# Patient Record
Sex: FEMALE | Race: WHITE | NOT HISPANIC OR LATINO | URBAN - METROPOLITAN AREA
[De-identification: names, ages, dates, MRNs, and addresses within clinical notes are randomized per-mention and may not be internally consistent; named-entity substitution may affect disease eponyms.]

---

## 2018-09-16 ENCOUNTER — EMERGENCY (EMERGENCY)
Facility: HOSPITAL | Age: 83
LOS: 0 days | Discharge: HOME | End: 2018-09-16
Attending: EMERGENCY MEDICINE
Payer: MEDICARE

## 2018-09-16 VITALS
WEIGHT: 194.01 LBS | SYSTOLIC BLOOD PRESSURE: 132 MMHG | DIASTOLIC BLOOD PRESSURE: 59 MMHG | TEMPERATURE: 98 F | RESPIRATION RATE: 16 BRPM | HEIGHT: 60 IN | OXYGEN SATURATION: 96 % | HEART RATE: 78 BPM

## 2018-09-16 DIAGNOSIS — Z86.718 PERSONAL HISTORY OF OTHER VENOUS THROMBOSIS AND EMBOLISM: ICD-10-CM

## 2018-09-16 DIAGNOSIS — M25.561 PAIN IN RIGHT KNEE: ICD-10-CM
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
## 2018-09-16 PROBLEM — Z00.00 ENCOUNTER FOR PREVENTIVE HEALTH EXAMINATION: Status: ACTIVE | Noted: 2018-09-16

## 2018-09-16 PROCEDURE — 73562 X-RAY EXAM OF KNEE 3: CPT | Mod: 26,RT

## 2018-09-16 PROCEDURE — 99283 EMERGENCY DEPT VISIT LOW MDM: CPT

## 2018-09-16 RX ORDER — CYCLOBENZAPRINE HYDROCHLORIDE 10 MG/1
1 TABLET, FILM COATED ORAL
Qty: 12 | Refills: 0
Start: 2018-09-16

## 2018-09-16 RX ORDER — ACETAMINOPHEN 500 MG
650 TABLET ORAL ONCE
Qty: 0 | Refills: 0 | Status: COMPLETED | OUTPATIENT
Start: 2018-09-16 | End: 2018-09-16

## 2018-09-16 RX ADMIN — Medication 650 MILLIGRAM(S): at 16:32

## 2018-09-16 NOTE — ED ADULT NURSE NOTE - NSIMPLEMENTINTERV_GEN_ALL_ED
Implemented All Universal Safety Interventions:  [place] to call system. Call bell, personal items and telephone within reach. Instruct patient to call for assistance. Room bathroom lighting operational. Non-slip footwear when patient is off stretcher. Physically safe environment: no spills, clutter or unnecessary equipment. Stretcher in lowest position, wheels locked, appropriate side rails in place.

## 2018-09-16 NOTE — ED ADULT TRIAGE NOTE - CHIEF COMPLAINT QUOTE
Pt c/o intermittent right knee pain s/p twisting it yesterday states she heard a snap. Pt states she usually ambulate using wheelchair when she is out

## 2018-09-16 NOTE — ED PROVIDER NOTE - PHYSICAL EXAMINATION
Gen: No acute distress, non toxic  HEENT: Mucous membranes moist, pink conjunctivae, EOMI  CV: RRR, nl s1/s2.  Resp: CTAB, normal rate and effort  GI: Abdomen soft, NT, ND. No rebound, no guarding  : No CVAT  Neuro: A&O x 3, moving all 4 extremities  MSK: No spine or joint tenderness to palpation. no laxity to right knee. can range, gait with slight favoring of right leg using cane. neurovascularly intact, patella in place. no masses. no swelling/erythema b/l le.   Skin: No rashes. intact and perfused.

## 2018-09-16 NOTE — ED PROVIDER NOTE - MEDICAL DECISION MAKING DETAILS
pt with twisting injury of right knee, ambulatory, no other red flags. xray neg. can ambulate. no immobilization given dvt's in past and no indication, will give close ortho f/u. also with sciatica, unlikely dvt given presentation and with filter and on coumadin.

## 2020-06-11 ENCOUNTER — EMERGENCY (EMERGENCY)
Facility: HOSPITAL | Age: 85
LOS: 0 days | Discharge: HOME | End: 2020-06-11
Attending: EMERGENCY MEDICINE
Payer: MEDICARE

## 2020-06-11 VITALS
OXYGEN SATURATION: 98 % | DIASTOLIC BLOOD PRESSURE: 82 MMHG | TEMPERATURE: 98 F | SYSTOLIC BLOOD PRESSURE: 146 MMHG | HEART RATE: 90 BPM | RESPIRATION RATE: 17 BRPM

## 2020-06-11 VITALS
DIASTOLIC BLOOD PRESSURE: 89 MMHG | TEMPERATURE: 98 F | HEIGHT: 59 IN | SYSTOLIC BLOOD PRESSURE: 161 MMHG | RESPIRATION RATE: 18 BRPM | WEIGHT: 188.05 LBS | HEART RATE: 106 BPM | OXYGEN SATURATION: 97 %

## 2020-06-11 LAB
ALBUMIN SERPL ELPH-MCNC: 3.6 G/DL — SIGNIFICANT CHANGE UP (ref 3.3–5)
ALP SERPL-CCNC: 130 U/L — HIGH (ref 40–120)
ALT FLD-CCNC: 18 U/L — SIGNIFICANT CHANGE UP (ref 12–78)
ANION GAP SERPL CALC-SCNC: 4 MMOL/L — LOW (ref 5–17)
APTT BLD: 51.7 SEC — HIGH (ref 27.5–36.3)
AST SERPL-CCNC: 32 U/L — SIGNIFICANT CHANGE UP (ref 15–37)
BASOPHILS # BLD AUTO: 0.04 K/UL — SIGNIFICANT CHANGE UP (ref 0–0.2)
BASOPHILS NFR BLD AUTO: 0.7 % — SIGNIFICANT CHANGE UP (ref 0–2)
BILIRUB SERPL-MCNC: 0.6 MG/DL — SIGNIFICANT CHANGE UP (ref 0.2–1.2)
BUN SERPL-MCNC: 41 MG/DL — HIGH (ref 7–23)
CALCIUM SERPL-MCNC: 8.9 MG/DL — SIGNIFICANT CHANGE UP (ref 8.5–10.1)
CHLORIDE SERPL-SCNC: 114 MMOL/L — HIGH (ref 96–108)
CO2 SERPL-SCNC: 24 MMOL/L — SIGNIFICANT CHANGE UP (ref 22–31)
CREAT SERPL-MCNC: 1.23 MG/DL — SIGNIFICANT CHANGE UP (ref 0.5–1.3)
EOSINOPHIL # BLD AUTO: 0.01 K/UL — SIGNIFICANT CHANGE UP (ref 0–0.5)
EOSINOPHIL NFR BLD AUTO: 0.2 % — SIGNIFICANT CHANGE UP (ref 0–6)
GLUCOSE SERPL-MCNC: 101 MG/DL — HIGH (ref 70–99)
HCT VFR BLD CALC: 38.2 % — SIGNIFICANT CHANGE UP (ref 34.5–45)
HGB BLD-MCNC: 12.1 G/DL — SIGNIFICANT CHANGE UP (ref 11.5–15.5)
IMM GRANULOCYTES NFR BLD AUTO: 0.4 % — SIGNIFICANT CHANGE UP (ref 0–1.5)
INR BLD: 6.35 RATIO — CRITICAL HIGH (ref 0.88–1.16)
LYMPHOCYTES # BLD AUTO: 1.46 K/UL — SIGNIFICANT CHANGE UP (ref 1–3.3)
LYMPHOCYTES # BLD AUTO: 26.8 % — SIGNIFICANT CHANGE UP (ref 13–44)
MCHC RBC-ENTMCNC: 27.2 PG — SIGNIFICANT CHANGE UP (ref 27–34)
MCHC RBC-ENTMCNC: 31.7 GM/DL — LOW (ref 32–36)
MCV RBC AUTO: 85.8 FL — SIGNIFICANT CHANGE UP (ref 80–100)
MONOCYTES # BLD AUTO: 0.71 K/UL — SIGNIFICANT CHANGE UP (ref 0–0.9)
MONOCYTES NFR BLD AUTO: 13.1 % — SIGNIFICANT CHANGE UP (ref 2–14)
NEUTROPHILS # BLD AUTO: 3.2 K/UL — SIGNIFICANT CHANGE UP (ref 1.8–7.4)
NEUTROPHILS NFR BLD AUTO: 58.8 % — SIGNIFICANT CHANGE UP (ref 43–77)
NRBC # BLD: 0 /100 WBCS — SIGNIFICANT CHANGE UP (ref 0–0)
PLATELET # BLD AUTO: 237 K/UL — SIGNIFICANT CHANGE UP (ref 150–400)
POTASSIUM SERPL-MCNC: 5 MMOL/L — SIGNIFICANT CHANGE UP (ref 3.5–5.3)
POTASSIUM SERPL-SCNC: 5 MMOL/L — SIGNIFICANT CHANGE UP (ref 3.5–5.3)
PROT SERPL-MCNC: 8.2 GM/DL — SIGNIFICANT CHANGE UP (ref 6–8.3)
PROTHROM AB SERPL-ACNC: 75.3 SEC — HIGH (ref 10–12.9)
RBC # BLD: 4.45 M/UL — SIGNIFICANT CHANGE UP (ref 3.8–5.2)
RBC # FLD: 15.4 % — HIGH (ref 10.3–14.5)
SODIUM SERPL-SCNC: 142 MMOL/L — SIGNIFICANT CHANGE UP (ref 135–145)
WBC # BLD: 5.44 K/UL — SIGNIFICANT CHANGE UP (ref 3.8–10.5)
WBC # FLD AUTO: 5.44 K/UL — SIGNIFICANT CHANGE UP (ref 3.8–10.5)

## 2020-06-11 PROCEDURE — 93970 EXTREMITY STUDY: CPT | Mod: 26

## 2020-06-11 PROCEDURE — 99285 EMERGENCY DEPT VISIT HI MDM: CPT

## 2020-06-11 RX ORDER — PHYTONADIONE (VIT K1) 5 MG
2.5 TABLET ORAL ONCE
Refills: 0 | Status: COMPLETED | OUTPATIENT
Start: 2020-06-11 | End: 2020-06-11

## 2020-06-11 RX ORDER — WARFARIN SODIUM 2.5 MG/1
1 TABLET ORAL
Qty: 0 | Refills: 0 | DISCHARGE

## 2020-06-11 RX ADMIN — Medication 2.5 MILLIGRAM(S): at 12:40

## 2020-06-11 NOTE — ED ADULT TRIAGE NOTE - CHIEF COMPLAINT QUOTE
Sent by Dr Horvath for elevated INR, takes coumadin, fell in April , legs reddened, carrying prescription for US of legs, no signs of bleeding

## 2020-06-11 NOTE — ED PROVIDER NOTE - OBJECTIVE STATEMENT
84 y/o F Hx of DVT on coumadin presents to ED due to elevated INR levels. 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 pt has not had any fevers or chills. Pt previously has had leg cellulitis prior and was already treated and healing

## 2020-06-11 NOTE — ED ADULT NURSE NOTE - OBJECTIVE STATEMENT
Pt A&Ox3, denies chest pain or sob sent by PMD for abnormal lab result. Pt c/o bilateral swelling to LE with chronic discoloration. Labs sent, pt via stretcher to ultrasound. Plan of care discussed, pt verbalize understanding

## 2020-06-11 NOTE — ED PROVIDER NOTE - CLINICAL SUMMARY MEDICAL DECISION MAKING FREE TEXT BOX
INR elevation - will treat with skipping one dose of coumadin as per protocol, given 1 dose of Vit K 2.5mg oral otherwise will dc with follow up with own doctors for issue.

## 2020-06-11 NOTE — ED PROVIDER NOTE - MUSCULOSKELETAL, MLM
bilateral darkening coloration of skin and pigmentation congruent with venastasis. Non-tender, slightly warm to touch.

## 2020-06-11 NOTE — ED ADULT NURSE NOTE - NSIMPLEMENTINTERV_GEN_ALL_ED
Implemented All Fall Risk Interventions:  [place] to call system. Call bell, personal items and telephone within reach. Instruct patient to call for assistance. Room bathroom lighting operational. Non-slip footwear when patient is off stretcher. Physically safe environment: no spills, clutter or unnecessary equipment. Stretcher in lowest position, wheels locked, appropriate side rails in place. Provide visual cue, wrist band, yellow gown, etc. Monitor gait and stability. Monitor for mental status changes and reorient to person, place, and time. Review medications for side effects contributing to fall risk. Reinforce activity limits and safety measures with patient and family.

## 2020-06-11 NOTE — ED PROVIDER NOTE - CARE PLAN
Principal Discharge DX:	INR (international normal ratio) abnormal  Secondary Diagnosis:	Venous stasis

## 2020-06-11 NOTE — ED PROVIDER NOTE - PATIENT PORTAL LINK FT
You can access the FollowMyHealth Patient Portal offered by [facility] by registering at the following website: [email or web address]. By joining [employer]’s FollowMyHealth portal, you will also be able to view your health information using other applications (apps) compatible with our system.

## 2020-06-12 DIAGNOSIS — M79.89 OTHER SPECIFIED SOFT TISSUE DISORDERS: ICD-10-CM

## 2020-06-12 DIAGNOSIS — Z79.01 LONG TERM (CURRENT) USE OF ANTICOAGULANTS: ICD-10-CM

## 2020-06-12 DIAGNOSIS — R79.1 ABNORMAL COAGULATION PROFILE: ICD-10-CM

## 2020-06-12 DIAGNOSIS — I82.409 ACUTE EMBOLISM AND THROMBOSIS OF UNSPECIFIED DEEP VEINS OF UNSPECIFIED LOWER EXTREMITY: ICD-10-CM

## 2020-06-12 DIAGNOSIS — I87.8 OTHER SPECIFIED DISORDERS OF VEINS: ICD-10-CM

## 2020-09-06 ENCOUNTER — EMERGENCY (EMERGENCY)
Facility: HOSPITAL | Age: 85
LOS: 0 days | Discharge: HOME | End: 2020-09-06
Attending: STUDENT IN AN ORGANIZED HEALTH CARE EDUCATION/TRAINING PROGRAM
Payer: MEDICARE

## 2020-09-06 VITALS
TEMPERATURE: 98 F | WEIGHT: 179.9 LBS | OXYGEN SATURATION: 98 % | SYSTOLIC BLOOD PRESSURE: 154 MMHG | HEIGHT: 59 IN | DIASTOLIC BLOOD PRESSURE: 68 MMHG | RESPIRATION RATE: 16 BRPM | HEART RATE: 78 BPM

## 2020-09-06 VITALS
OXYGEN SATURATION: 98 % | SYSTOLIC BLOOD PRESSURE: 151 MMHG | RESPIRATION RATE: 15 BRPM | HEART RATE: 74 BPM | DIASTOLIC BLOOD PRESSURE: 66 MMHG | TEMPERATURE: 98 F

## 2020-09-06 DIAGNOSIS — I82.409 ACUTE EMBOLISM AND THROMBOSIS OF UNSPECIFIED DEEP VEINS OF UNSPECIFIED LOWER EXTREMITY: ICD-10-CM

## 2020-09-06 DIAGNOSIS — Z79.01 LONG TERM (CURRENT) USE OF ANTICOAGULANTS: ICD-10-CM

## 2020-09-06 DIAGNOSIS — R79.1 ABNORMAL COAGULATION PROFILE: ICD-10-CM

## 2020-09-06 DIAGNOSIS — L03.116 CELLULITIS OF LEFT LOWER LIMB: ICD-10-CM

## 2020-09-06 DIAGNOSIS — I10 ESSENTIAL (PRIMARY) HYPERTENSION: ICD-10-CM

## 2020-09-06 LAB
ALBUMIN SERPL ELPH-MCNC: 3.8 G/DL — SIGNIFICANT CHANGE UP (ref 3.3–5)
ALP SERPL-CCNC: 122 U/L — HIGH (ref 40–120)
ALT FLD-CCNC: 22 U/L — SIGNIFICANT CHANGE UP (ref 12–78)
ANION GAP SERPL CALC-SCNC: 7 MMOL/L — SIGNIFICANT CHANGE UP (ref 5–17)
APTT BLD: 42.8 SEC — HIGH (ref 27.5–35.5)
AST SERPL-CCNC: 29 U/L — SIGNIFICANT CHANGE UP (ref 15–37)
BASOPHILS # BLD AUTO: 0.04 K/UL — SIGNIFICANT CHANGE UP (ref 0–0.2)
BASOPHILS NFR BLD AUTO: 0.7 % — SIGNIFICANT CHANGE UP (ref 0–2)
BILIRUB SERPL-MCNC: 0.4 MG/DL — SIGNIFICANT CHANGE UP (ref 0.2–1.2)
BUN SERPL-MCNC: 38 MG/DL — HIGH (ref 7–23)
CALCIUM SERPL-MCNC: 9.2 MG/DL — SIGNIFICANT CHANGE UP (ref 8.5–10.1)
CHLORIDE SERPL-SCNC: 110 MMOL/L — HIGH (ref 96–108)
CO2 SERPL-SCNC: 22 MMOL/L — SIGNIFICANT CHANGE UP (ref 22–31)
CREAT SERPL-MCNC: 2.01 MG/DL — HIGH (ref 0.5–1.3)
EOSINOPHIL # BLD AUTO: 0 K/UL — SIGNIFICANT CHANGE UP (ref 0–0.5)
EOSINOPHIL NFR BLD AUTO: 0 % — SIGNIFICANT CHANGE UP (ref 0–6)
GLUCOSE SERPL-MCNC: 88 MG/DL — SIGNIFICANT CHANGE UP (ref 70–99)
HCT VFR BLD CALC: 36.5 % — SIGNIFICANT CHANGE UP (ref 34.5–45)
HGB BLD-MCNC: 11.5 G/DL — SIGNIFICANT CHANGE UP (ref 11.5–15.5)
IMM GRANULOCYTES NFR BLD AUTO: 0.3 % — SIGNIFICANT CHANGE UP (ref 0–1.5)
INR BLD: 8.22 RATIO — CRITICAL HIGH (ref 0.88–1.16)
LYMPHOCYTES # BLD AUTO: 1.34 K/UL — SIGNIFICANT CHANGE UP (ref 1–3.3)
LYMPHOCYTES # BLD AUTO: 23 % — SIGNIFICANT CHANGE UP (ref 13–44)
MCHC RBC-ENTMCNC: 27.6 PG — SIGNIFICANT CHANGE UP (ref 27–34)
MCHC RBC-ENTMCNC: 31.5 GM/DL — LOW (ref 32–36)
MCV RBC AUTO: 87.7 FL — SIGNIFICANT CHANGE UP (ref 80–100)
MONOCYTES # BLD AUTO: 0.57 K/UL — SIGNIFICANT CHANGE UP (ref 0–0.9)
MONOCYTES NFR BLD AUTO: 9.8 % — SIGNIFICANT CHANGE UP (ref 2–14)
NEUTROPHILS # BLD AUTO: 3.86 K/UL — SIGNIFICANT CHANGE UP (ref 1.8–7.4)
NEUTROPHILS NFR BLD AUTO: 66.2 % — SIGNIFICANT CHANGE UP (ref 43–77)
NRBC # BLD: 0 /100 WBCS — SIGNIFICANT CHANGE UP (ref 0–0)
PLATELET # BLD AUTO: 276 K/UL — SIGNIFICANT CHANGE UP (ref 150–400)
POTASSIUM SERPL-MCNC: 5.4 MMOL/L — HIGH (ref 3.5–5.3)
POTASSIUM SERPL-SCNC: 5.4 MMOL/L — HIGH (ref 3.5–5.3)
PROT SERPL-MCNC: 8.2 GM/DL — SIGNIFICANT CHANGE UP (ref 6–8.3)
PROTHROM AB SERPL-ACNC: 86.3 SEC — HIGH (ref 10.6–13.6)
RBC # BLD: 4.16 M/UL — SIGNIFICANT CHANGE UP (ref 3.8–5.2)
RBC # FLD: 15.1 % — HIGH (ref 10.3–14.5)
SODIUM SERPL-SCNC: 139 MMOL/L — SIGNIFICANT CHANGE UP (ref 135–145)
WBC # BLD: 5.83 K/UL — SIGNIFICANT CHANGE UP (ref 3.8–10.5)
WBC # FLD AUTO: 5.83 K/UL — SIGNIFICANT CHANGE UP (ref 3.8–10.5)

## 2020-09-06 PROCEDURE — 99284 EMERGENCY DEPT VISIT MOD MDM: CPT

## 2020-09-06 RX ORDER — PHYTONADIONE (VIT K1) 5 MG
2.5 TABLET ORAL ONCE
Refills: 0 | Status: COMPLETED | OUTPATIENT
Start: 2020-09-06 | End: 2020-09-06

## 2020-09-06 RX ADMIN — Medication 2.5 MILLIGRAM(S): at 16:57

## 2020-09-06 NOTE — ED PROVIDER NOTE - PATIENT PORTAL LINK FT
You can access the FollowMyHealth Patient Portal offered by [facility] by registering at the following website: [email or web address]. By joining [employer]’s FollowMyHealth portal, you will also be able to view your health information using other applications (apps) compatible with our system.

## 2020-09-06 NOTE — ED PROVIDER NOTE - NSFOLLOWUPCLINICS_GEN_ALL_ED_FT
[facility]  Coumadin  [address] [place] [address]  Phone: [phone]  Fax:   Follow Up Time: 1-3 Days

## 2020-09-06 NOTE — ED PROVIDER NOTE - NSFOLLOWUPINSTRUCTIONS_ED_ALL_ED_FT
Please skip your next 2 doses of coumadin. Please follow up in anticoagulation clinic for repeat INR within 72 hours.

## 2020-09-06 NOTE — ED PROVIDER NOTE - CLINICAL SUMMARY MEDICAL DECISION MAKING FREE TEXT BOX
Ins 85yo F w/ PMH HTN, DVT on Coumadin since 2003 sent to ED by PCP d/t INR > 9. AFVSS. Pt denies obvious bleeding, no evidence of occult bleeding on HPI/ROS/PE. Plan: Obtain CBC, CMP, PT/PTT/INR. INR 8.2. D/t advanced age, will give Vit K. Pt w/ decr renal function since June. Otherwise labs w/o significant abnormalities. Called pt PCP to discuss w/o answer. Pt is stable for d/c home. Pt given f/u w/ [facility]. Stressed importance of regular INR checks. Pt will make phone calls to get f/u in a clinic Tuesday AM and go to her PCP office for rpt INR. Pt instructed to skip next 2 doses of coumadin. Return signs and sx d/w pt, she understands and agrees w/ this plan.

## 2020-09-06 NOTE — ED PROVIDER NOTE - DISPOSITION TYPE
How Many Skin Cancers Have You Had?: more than one What Is The Reason For Today's Visit?: History of Non-Melanoma Skin Cancer DISCHARGE

## 2020-09-06 NOTE — ED PROVIDER NOTE - PHYSICAL EXAMINATION
GEN: Awake, alert, interactive, NAD.  HEAD AND NECK: NC/AT. Airway patent. Neck supple.   EYES:  Clear b/l. EOMI. PERRL.   ENT: Moist mucus membranes.   CARDIAC: Regular rate, regular rhythm. No evident pedal edema.    RESP/CHEST: Normal respiratory effort with no use of accessory muscles or retractions. Clear throughout on auscultation.  ABD: Soft, non-distended, non-tender. No rebound, no guarding.   BACK: No midline spinal TTP. No CVAT.   EXTREMITIES: Moving all extremities with no apparent deformities.   SKIN: Warm, dry. L distal tib/fib cellulitis, dressing C/D/I.   NEURO: AOx3, CN II-XII grossly intact, no focal deficits.   PSYCH: Appropriate mood and affect.

## 2020-09-06 NOTE — ED PROVIDER NOTE - PROGRESS NOTE DETAILS
Vit K given INR > 8, no evidence of bleeding, however d/t advanced age, will give Vit K 2.5 Called pt PCP, Dr Rina Horvath. Left  requesting call back to discuss pt results, future planning for better control of INR.  Pt reports previously followed in INR clinic w/ Dr Moya, but her insurance is no longer accepted. Pt currently w/o scheduled INR checks, not in coumadin clinic, w/o scheduled regular f/u.

## 2020-09-06 NOTE — ED PROVIDER NOTE - OBJECTIVE STATEMENT
87yo F sent to ED by PCP d/t INR > 9. Pt reports blood drawn several days, lab error, redrawn, pt called and told to go to ED d/t elevated INR. Reports hx of similar months ago. Pt on coumadin. On coumadin x 20yrs, INR target 2.5. On coumadin d/t DVTs. Pt endorses fatigue. ROS otherwise neg. Pt w/ LLE cellulitis, being followed by wound care. PCP also wanted to check pt renal function.     PMH DVT, cellulitis, occasional HTN meds as listed, NKDA, PSH hernia. 85yo F sent to ED by PCP d/t INR > 9. Pt reports blood drawn several days, lab error, redrawn, pt called and told to go to ED d/t elevated INR. Reports hx of similar months ago (chart review shows INR 6/3 6/11/20). Pt on coumadin. On coumadin x 20yrs, INR target 2.5. On coumadin d/t DVTs. Pt endorses generalized fatigue, but also mentions several life stressors. ROS otherwise neg. Pt w/ LLE cellulitis, being followed by wound care. PCP also wanted to check pt renal function.     PMH DVT, cellulitis, HTN, meds as listed, NKDA, PSH hernia.

## 2022-03-09 NOTE — ED ADULT NURSE NOTE - NS ED NOTE ABUSE SUSPICION NEGLECT YN
Patient withdrawn to room throughout the day  Minimal in conversation  Denies any pain or discomfort  Denies any anxiety or depression  Denies SI/HI/AVH  Compliant with medication and meals  No acute behaviors noted  Denies any distress  Limited thoughts  Q 7 min safety checks maintained  Monitoring continues  No

## 2022-03-28 NOTE — ED ADULT NURSE NOTE - PAIN RATING/NUMBER SCALE (0-10): ACTIVITY
3/28/2022    Referring Provider: Referred Self    Presenting Information:   I met with Julienne today through the Cancer Risk Management Program via video for genetic counseling to discuss the known MSH6 mutation in her family. 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 She is here today to review this history, cancer screening recommendations, and available genetic testing options.    Personal History:  Julienne is a 31 year old female.  She does not have any personal history of cancer.      She had her first menstrual period at age 11, her first child at age 31, and is premenopausal. She reports a history pf PCOS and that she is 9 months post-partum, periods have not resumed at this time. Julienne has her ovaries, fallopian tubes and uterus in place.  She reports past history of oral contraceptive use for about 8 years and that she has never been on hormone replacement therapy.      Her most recent OB-GYN exam and Pap smear in 2019 were normal.  She reports regular self-breast exams and clinical exams; has not started screening mammograms given her age. She has had two breast ultrasounds in the past few years due to palpable lumps; findings were benign. Colonoscopy in 2017 for IBS work-up was unremarkable and general population follow-up was recommended. She report regular (every 1-2 years) dermatology visits for skin checks.  Julienne reported no tobacco use, and social (0-2 drinks per week) alcohol use.    Family History: Cancer family history and pertinent information reviewed below (Please see scanned pedigree for detailed family history information)    Father, age 61, has no personal history of cancer. Has tested positive for the familial MSH6 mutation via [facility].     Paternal aunt, age 59, has a history of multiple skin cancers (basal cells, squamous cells, and melanoma) that began in her late 40's; does have a history of sun tanning; history of essential thrombocythemia;  currently had an endometrial biopsy that may have found abnormal calls and is being worked-up further. This aunt has tested positive for the familial MSH6 mutation.     Paternal aunt (identical twin to above aunt), age 59, has a history of cervical cancer diagnosed at age 40 and is s/p ROSE-BSO; DOLLY exposure reported during pregnancy. This aunt is also positive for the familial MSH6 mutation.     Paternal uncle passed at age 48 from esophageal cancer diagnosed at age 48; he was a smoker.    Paternal grandmother, age 85, was recently diagnosed with lung cancer; has past history of smoking.     Paternal grandfather, age 88, has a history of prostate cancer diagnosed at age 69 and colon cancer diagnosed at age 72; also has a history of sebaceous neoplasms, basal cell carcinoma, squamous cell carcinoma. This grandfather was the testing proband for the family in which the familial MSH6 mutation was originally found.     Paternal grandfather's sister had a jaw cancer diagnosed at an unknown age.     Paternal grandmother's sister had ovarian cancer diagnosed at age 54.     Multiple paternal first-cousins (grandfather's side) once-removed with cancer history including breast (at age 54 and 60), skin cancers (BCC, SCC), non-Hodgkin's lymphoma, prostate cancer, and melanoma.     There is no known Ashkenazi Pentec[employer]al ancestry on either side of her family. There is no reported consanguinity.    Discussion:    Julienne has a family history of colon cancer with an identified MSH6 gene mutation.  Specifically, the mutation identified in her father, paternal grandfather, and two paternal aunts is called c.3514dup ([id number]).  We discussed that this gene is associated with Mayer syndrome and an increased risk for colon, gynecologic, and other cancers.  We discussed the impact of this testing on Julienne and her family in detail.  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 She also gave me information to help link her grandfather's testing to her order through [facility]'s free family follow-up testing program.     We discussed the natural history and genetics of Mayer syndrome caused by mutations in the MSH6 gene. A detailed handout regarding this cancer syndrome and the information we discussed was provided to Julienne at the end of our appointment today and can be found in the after visit summary.  Topics included: inheritance pattern, cancer risks, cancer screening recommendations, and also risks, benefits and limitations of testing.    We reviewed that Mayer syndrome due to mutations in the MSH6 gene is associated with increased risk for colon cancer (10-44%), uterine cancer (16-49%), ovarian cancer (up to 13%), stomach cancer (up to 7.8%) and urinary tract cancers (up to 5.5%). We discussed that sebaceous neoplasms have also jessica seen in families with MSH6-related Mayer syndrome, although specific lifetime risks are not available.     We reviewed some of the the screeningmanagement recommendations for those with MSH6-related Mayer syndrome including earlier/more frequent colonoscopies and gynecologic cancer screening vs surgery. I did share that we have a clinical nurse specialist who helps coordinate this screening should Julienne test positive for the familial mutation.     We reviewed that mutations in the MSH6 gene are inherited in an autosomal dominant pattern.  This means that Julienne has a 50% chance of inheriting the same mutation which was identified in her father.  Mutations in this gene to not skip generations.     Given her family history, Julienne meets National Comprehensive Cancer Network guidelines for genetic testing for the familial MSH6 mutation.     We briefly discussed the option of more comprehensive testing for additional genes  associated with hereditary cancers. We discussed that many of her close-relatives' cancer histories (with the excpetion of her paternal grandfather in the context of his Mayer syndrome diagnosis) seem to have at least some level of known environmental exposure and are otherwise not overly concerning from an inherited risk perspective. We also discussed that the cancers seen in her paternal aunts and uncle are typically not those that we would associated with Mayer syndrome and may certainly be unrelated. Julienne expressed understanding and declined any additional testing at this time. She opted to proceed with targeted MSH6 testing through [facility].      Genetic testing is available for the familial MSH6 gene mutation identified in her father and other paternal relatives.  Julienne elected to proceed with this testing today.      Consent was obtained virtually with no witness required due to the current covid19 global pandemic.    The information from genetic testing may determine additional cancer screening recommendations as well as options for risk reducing surgeries for Julienne and her relatives.  These recommendations will be discussed in detail once genetic testing is completed.    Given the current COVID19 global pandemic, we discussed genetic testing options for Julienne including having [facility] send a saliva kit to her home or coming into the clinic for a blood draw. Julienne opted to have a salivia kit sent to her home to complete the genetic testing. A kit was ordered from [facility] and a saliva collection kit will be sent to Julienne's home address. I confirmed with Julienne that the address we have on file is her current and correct address. We discussed some of the limitations of completing genetic testing via saliva and Julienne was instructed to follow the instruction provided in the kit to ensure the best possibility of success for saliva genetic testing.       Plan:  1) Today, Julienne elected to proceed  with MSH6 genetic testing (1 gene) [facility]. A saliva collection kit will be mailed to Julienne's home address by [facility]. Julienne is encouraged to follow the specimen collection and return mail instructions provided in the kit.  2) This information should be available in approximately 2-3 weeks from the lab receiving the sample.  3) Julienne will be contacted by our scheduling department to set up a result disclosure appointment.    Video start time: 2:12pm  Video end time: 3:05pm    Julianna Kaminski MS, [facility]  Licensed, Certified Genetic Counselor  [facility]  [phone]  [email or web address]           5

## 2024-06-17 ENCOUNTER — APPOINTMENT (OUTPATIENT)
Dept: VASCULAR SURGERY | Facility: CLINIC | Age: 89
End: 2024-06-17

## 2024-06-25 ENCOUNTER — APPOINTMENT (OUTPATIENT)
Dept: VASCULAR SURGERY | Facility: CLINIC | Age: 89
End: 2024-06-25
Payer: MEDICARE

## 2024-06-25 VITALS — HEART RATE: 67 BPM | SYSTOLIC BLOOD PRESSURE: 171 MMHG | DIASTOLIC BLOOD PRESSURE: 81 MMHG | TEMPERATURE: 97.7 F

## 2024-06-25 VITALS
HEIGHT: 58 IN | HEART RATE: 75 BPM | SYSTOLIC BLOOD PRESSURE: 149 MMHG | BODY MASS INDEX: 33.58 KG/M2 | DIASTOLIC BLOOD PRESSURE: 79 MMHG | WEIGHT: 160 LBS

## 2024-06-25 PROCEDURE — 93970 EXTREMITY STUDY: CPT

## 2024-06-25 PROCEDURE — 99203 OFFICE O/P NEW LOW 30 MIN: CPT | Mod: 25

## 2024-06-25 PROCEDURE — 29580 STRAPPING UNNA BOOT: CPT | Mod: RT

## 2024-06-25 PROCEDURE — 99204 OFFICE O/P NEW MOD 45 MIN: CPT | Mod: 25

## 2024-06-25 PROCEDURE — ZZZZZ: CPT

## 2024-06-25 RX ORDER — FUROSEMIDE 20 MG/1
20 TABLET ORAL
Refills: 0 | Status: ACTIVE | COMMUNITY

## 2024-06-25 RX ORDER — CEPHALEXIN 500 MG/1
TABLET ORAL
Refills: 0 | Status: ACTIVE | COMMUNITY

## 2024-06-25 RX ORDER — ATORVASTATIN CALCIUM 20 MG/1
20 TABLET, FILM COATED ORAL
Refills: 0 | Status: ACTIVE | COMMUNITY

## 2024-06-25 RX ORDER — WARFARIN 3 MG/1
3 TABLET ORAL
Refills: 0 | Status: ACTIVE | COMMUNITY

## 2024-06-25 RX ORDER — LINAGLIPTIN 5 MG/1
5 TABLET, FILM COATED ORAL
Refills: 0 | Status: ACTIVE | COMMUNITY

## 2024-06-28 ENCOUNTER — APPOINTMENT (OUTPATIENT)
Dept: VASCULAR SURGERY | Facility: CLINIC | Age: 89
End: 2024-06-28
Payer: MEDICARE

## 2024-06-28 VITALS
WEIGHT: 170 LBS | SYSTOLIC BLOOD PRESSURE: 122 MMHG | BODY MASS INDEX: 33.38 KG/M2 | HEIGHT: 60 IN | HEART RATE: 64 BPM | DIASTOLIC BLOOD PRESSURE: 73 MMHG

## 2024-06-28 DIAGNOSIS — I87.2 VENOUS INSUFFICIENCY (CHRONIC) (PERIPHERAL): ICD-10-CM

## 2024-06-28 DIAGNOSIS — I83.893 VARICOSE VEINS OF BILATERAL LOWER EXTREMITIES WITH OTHER COMPLICATIONS: ICD-10-CM

## 2024-06-28 PROCEDURE — 29580 STRAPPING UNNA BOOT: CPT | Mod: RT

## 2024-06-28 PROCEDURE — 99213 OFFICE O/P EST LOW 20 MIN: CPT | Mod: 25

## 2024-07-09 ENCOUNTER — APPOINTMENT (OUTPATIENT)
Dept: VASCULAR SURGERY | Facility: CLINIC | Age: 89
End: 2024-07-09
Payer: MEDICARE

## 2024-07-09 VITALS
SYSTOLIC BLOOD PRESSURE: 128 MMHG | HEIGHT: 60 IN | WEIGHT: 170 LBS | BODY MASS INDEX: 33.38 KG/M2 | HEART RATE: 72 BPM | TEMPERATURE: 97.8 F | DIASTOLIC BLOOD PRESSURE: 69 MMHG

## 2024-07-09 DIAGNOSIS — I87.2 VENOUS INSUFFICIENCY (CHRONIC) (PERIPHERAL): ICD-10-CM

## 2024-07-09 DIAGNOSIS — I83.893 VARICOSE VEINS OF BILATERAL LOWER EXTREMITIES WITH OTHER COMPLICATIONS: ICD-10-CM

## 2024-07-09 PROCEDURE — 99213 OFFICE O/P EST LOW 20 MIN: CPT | Mod: 25

## 2024-07-09 PROCEDURE — 29580 STRAPPING UNNA BOOT: CPT | Mod: RT

## 2024-07-29 ENCOUNTER — APPOINTMENT (OUTPATIENT)
Dept: VASCULAR SURGERY | Facility: CLINIC | Age: 89
End: 2024-07-29
Payer: MEDICARE

## 2024-07-29 VITALS
HEIGHT: 60 IN | DIASTOLIC BLOOD PRESSURE: 87 MMHG | HEART RATE: 77 BPM | WEIGHT: 170 LBS | SYSTOLIC BLOOD PRESSURE: 176 MMHG | BODY MASS INDEX: 33.38 KG/M2

## 2024-07-29 VITALS — HEART RATE: 70 BPM | SYSTOLIC BLOOD PRESSURE: 156 MMHG | DIASTOLIC BLOOD PRESSURE: 78 MMHG

## 2024-07-29 DIAGNOSIS — I87.2 VENOUS INSUFFICIENCY (CHRONIC) (PERIPHERAL): ICD-10-CM

## 2024-07-29 DIAGNOSIS — I83.893 VARICOSE VEINS OF BILATERAL LOWER EXTREMITIES WITH OTHER COMPLICATIONS: ICD-10-CM

## 2024-07-29 DIAGNOSIS — L03.115 CELLULITIS OF RIGHT LOWER LIMB: ICD-10-CM

## 2024-07-29 PROCEDURE — 99213 OFFICE O/P EST LOW 20 MIN: CPT

## 2024-07-29 NOTE — HISTORY OF PRESENT ILLNESS
[FreeTextEntry1] : Pt c/o bilateral leg pain swelling and discomfort w activity  pt states  new right ankle wound pt  was  started on po abx by nikhil ROD  Pt denies recent injury, travel or thrombophilic event   Pt denies lower extremities arterial insufficiency symptoms   Pt denies any recent  cardiac c/o , SOB, Chest Pain or recent heart attacks   [de-identified] : pt is here to evaluate right medial malleolar wound wound is clean minimal serous drainage denies pain pt does not want unna boot therapy anymore because it's too tight and uncomfortable prefers medihoney and 2 layer wrap pt is receiving home care 2x/week pt is partially compliant LLE compression stocking

## 2024-07-29 NOTE — DATA REVIEWED
[FreeTextEntry1] : 6/25/2024 Venous Doppler Kash LE                              RLE GSV  insuff from sfj  to knee level                                      sig for  chronic dvt in  cfv, distal sfv, pop v, ptv                                            chronic changes in  distal thigh gsv                             LLE GSV insuff from sfj  to distal calf level                                     ASV insuff spj to  distal thigh level                                     sig  chronic dvt in  cfv, prox sfv, pop v, ptv, gastroc v, per v

## 2024-07-29 NOTE — ASSESSMENT
[Arterial/Venous Disease] : arterial/venous disease [Other: _____] : [unfilled] [Ulcer Care] : ulcer care [FreeTextEntry1] : Impression symptomatic venous insuff, rle medial ankle wound slow to heal   Plan  Med Conservative management leg elevation, right knee high compression stockings 20-30mm Hg when RLE wound is healed, wt loss, diet control continue lle compression stocking use pt refuses RLE unna boot therapy continue home care 2x/week with medihoney, kerlix and ace pt does not want nasir le gsv rfa  pt wants to hold off and continue with conservative management ov in 4 weeks to be re eval      Varicose veins are enlarged, twisted veins. Varicose veins are caused by increased blood pressure in the veins.  The blood moves towards the heart by 1-way valves in the veins. When the valves become weakened or damaged, blood can collect in the veins and pool in your lower legs (ankles). This causes the veins to become enlarged and incompetent with reflux. Sitting or standing for long periods can cause blood to pool in the leg veins, increasing the pressure within the veins.  Risk factors for varicose veins or venous disease may include:  obesity, older age, standing or sitting for prolonged periods of time for several years, being female, pregnancy, taking oral contraceptive pills or hormone replacement, being inactive, and/or smoking.  The most common symptoms of varicose veins are sensations in the legs, such as a heavy feeling, burning, and/or aching. However, each individual may experience symptoms differently.  Other symptoms may include:  color changes in the skin, sores on the legs, or rash.  Severe varicose veins or venous disease may eventually produce long-term mild swelling that can result in more serious skin and tissue problems, such as ulcers and non-healing sores. Varicose veins and venous disease are diagnosed by a complete medical history, physical examination, and diagnostic studies for varicose veins including duplex ultrasound and color-flow imaging.   Medical treatment for varicose veins and venous disease include:  compression stockings, sclerotherapy, endovenous ablation and/or surgical treatment with microphlebectomy.

## 2024-07-29 NOTE — PHYSICAL EXAM
[1+] : right 1+ [2+] : left 2+ [Ankle Swelling (On Exam)] : present [Ankle Swelling Bilaterally] : bilaterally  [Varicose Veins Of Lower Extremities] : bilaterally [] : bilaterally [Ankle Swelling On The Right] : mild [Skin Ulcer] : ulcer [Alert] : alert [Oriented to Person] : oriented to person [Oriented to Place] : oriented to place [Oriented to Time] : oriented to time [Calm] : calm [Right Carotid Bruit] : no bruit heard over the right carotid [Left Carotid Bruit] : no bruit heard over the left carotid [de-identified] : nad [de-identified] : wnl [de-identified] : no respiratory distress [FreeTextEntry1] : Mild bilateral leg venous insufficiency w mild bilateral leg stasis dermatitis, hyperpigmentation in the gator area and mild bilateral leg edema Multiple  bilateral leg small varicose  veins and spider veins  ant post calf and shin RLE Varicose veins measuring  1-3 mm in size on the calf /shin LLE Varicose veins measuring  1-3 mm in size on the tcalf /shin right  venous medial malleolus  ulcer 5mm diam w  mild granulation tissue  serous drainage [de-identified] : wnl ambulates slowly [de-identified] : right venous medial malleolar ulcer 5mm diam [de-identified] : Kash Cranial nerves 2-12 kash grossly intact [de-identified] : cooperative

## 2024-08-08 ENCOUNTER — NON-APPOINTMENT (OUTPATIENT)
Age: 89
End: 2024-08-08

## 2024-08-09 PROBLEM — L03.115 CELLULITIS OF RIGHT LOWER EXTREMITY: Status: ACTIVE | Noted: 2024-08-09

## 2024-08-09 RX ORDER — AMOXICILLIN AND CLAVULANATE POTASSIUM 500; 125 MG/1; MG/1
500-125 TABLET, FILM COATED ORAL
Qty: 20 | Refills: 0 | Status: ACTIVE | COMMUNITY
Start: 2024-08-09 | End: 1900-01-01

## 2024-08-12 ENCOUNTER — APPOINTMENT (OUTPATIENT)
Dept: VASCULAR SURGERY | Facility: CLINIC | Age: 89
End: 2024-08-12
Payer: MEDICARE

## 2024-08-12 DIAGNOSIS — I87.2 VENOUS INSUFFICIENCY (CHRONIC) (PERIPHERAL): ICD-10-CM

## 2024-08-12 DIAGNOSIS — I83.893 VARICOSE VEINS OF BILATERAL LOWER EXTREMITIES WITH OTHER COMPLICATIONS: ICD-10-CM

## 2024-08-12 PROCEDURE — 29580 STRAPPING UNNA BOOT: CPT | Mod: 50

## 2024-08-12 PROCEDURE — 99213 OFFICE O/P EST LOW 20 MIN: CPT | Mod: 25

## 2024-08-12 NOTE — PROCEDURE
[FreeTextEntry1] : Kash Unna boot applied from toes to knee  w/o complications Pt bear procedure well Pt is not allergic to the unna boot

## 2024-08-12 NOTE — PHYSICAL EXAM
[1+] : right 1+ [2+] : left 2+ [Ankle Swelling (On Exam)] : present [Ankle Swelling Bilaterally] : bilaterally  [Varicose Veins Of Lower Extremities] : bilaterally [] : bilaterally [Ankle Swelling On The Right] : mild [Skin Ulcer] : ulcer [Alert] : alert [Oriented to Person] : oriented to person [Oriented to Place] : oriented to place [Oriented to Time] : oriented to time [Calm] : calm [Right Carotid Bruit] : no bruit heard over the right carotid [Left Carotid Bruit] : no bruit heard over the left carotid [de-identified] : nad [de-identified] : wnl [de-identified] : no respiratory distress [FreeTextEntry1] : Mild bilateral leg venous insufficiency w mild bilateral leg stasis dermatitis, hyperpigmentation in the gator area and mild bilateral leg edema Multiple  bilateral leg small varicose  veins and spider veins  ant post calf and shin RLE Varicose veins measuring  1-3 mm in size on the calf /shin LLE Varicose veins measuring  1-3 mm in size on the tcalf /shin right  venous medial malleolus  ulcer 3mm diam w  mild granulation tissue  LLE severe skin changes, preulcerations [de-identified] : wnl ambulates slowly [de-identified] : right venous medial malleolar ulcer 3mm diam [de-identified] : Kash Cranial nerves 2-12 kash grossly intact [de-identified] : cooperative

## 2024-08-12 NOTE — HISTORY OF PRESENT ILLNESS
[FreeTextEntry1] : Pt c/o bilateral leg pain swelling and discomfort w activity  pt states  new right ankle wound pt  was  started on po abx by nikhil ROD  Pt denies recent injury, travel or thrombophilic event   Pt denies lower extremities arterial insufficiency symptoms   Pt denies any recent  cardiac c/o , SOB, Chest Pain or recent heart attacks   [de-identified] : pt is here to evaluate bilateral legs erythema improved right medial malleolar wound almost healed no active drainage denies pain getting RLE unna boot 2x/week pt is partially compliant LLE compression stocking  on augmentin 500 bid x 10 days

## 2024-08-12 NOTE — ASSESSMENT
[Arterial/Venous Disease] : arterial/venous disease [Other: _____] : [unfilled] [Ulcer Care] : ulcer care [FreeTextEntry1] : Ms. CHRISTOPHER DOWELL is a 89 year F with lymphedema secondary to chronic venous insufficiency extending proximally into truncal region. Patient has remained compliant with daily use of compression stockings 20-30 mm hg, elevation and exercise since 5/1/24 yet symptoms persist and mild hyperpigmentation noted.      Impression symptomatic venous insuff, rle medial ankle wound slow to heal   Plan  Med Conservative management leg elevation, knee high compression stockings 20-30mm Hg unna boot d/c, wt loss, diet control home care bilateral unna boot 2x/week  will refer for Tactile Flexitouch pump continue augmentin 500 mg BID until completed d/w pt indication, risks and benefits of bilateral gsv rfa (left first followed by right) pt will call to schedule bilateral gsv rfa ov in 4 weeks to be re eval      Varicose veins are enlarged, twisted veins. Varicose veins are caused by increased blood pressure in the veins.  The blood moves towards the heart by 1-way valves in the veins. When the valves become weakened or damaged, blood can collect in the veins and pool in your lower legs (ankles). This causes the veins to become enlarged and incompetent with reflux. Sitting or standing for long periods can cause blood to pool in the leg veins, increasing the pressure within the veins.  Risk factors for varicose veins or venous disease may include:  obesity, older age, standing or sitting for prolonged periods of time for several years, being female, pregnancy, taking oral contraceptive pills or hormone replacement, being inactive, and/or smoking.  The most common symptoms of varicose veins are sensations in the legs, such as a heavy feeling, burning, and/or aching. However, each individual may experience symptoms differently.  Other symptoms may include:  color changes in the skin, sores on the legs, or rash.  Severe varicose veins or venous disease may eventually produce long-term mild swelling that can result in more serious skin and tissue problems, such as ulcers and non-healing sores. Varicose veins and venous disease are diagnosed by a complete medical history, physical examination, and diagnostic studies for varicose veins including duplex ultrasound and color-flow imaging.   Medical treatment for varicose veins and venous disease include:  compression stockings, sclerotherapy, endovenous ablation and/or surgical treatment with microphlebectomy.

## 2024-08-27 ENCOUNTER — APPOINTMENT (OUTPATIENT)
Dept: VASCULAR SURGERY | Facility: CLINIC | Age: 89
End: 2024-08-27

## 2024-09-10 ENCOUNTER — APPOINTMENT (OUTPATIENT)
Dept: VASCULAR SURGERY | Facility: CLINIC | Age: 89
End: 2024-09-10
Payer: MEDICARE

## 2024-09-10 VITALS
DIASTOLIC BLOOD PRESSURE: 69 MMHG | TEMPERATURE: 97.9 F | BODY MASS INDEX: 33.38 KG/M2 | SYSTOLIC BLOOD PRESSURE: 151 MMHG | HEIGHT: 60 IN | WEIGHT: 170 LBS | HEART RATE: 73 BPM

## 2024-09-10 VITALS — HEART RATE: 71 BPM | DIASTOLIC BLOOD PRESSURE: 66 MMHG | SYSTOLIC BLOOD PRESSURE: 129 MMHG

## 2024-09-10 DIAGNOSIS — I87.2 VENOUS INSUFFICIENCY (CHRONIC) (PERIPHERAL): ICD-10-CM

## 2024-09-10 DIAGNOSIS — I83.893 VARICOSE VEINS OF BILATERAL LOWER EXTREMITIES WITH OTHER COMPLICATIONS: ICD-10-CM

## 2024-09-10 DIAGNOSIS — L03.115 CELLULITIS OF RIGHT LOWER LIMB: ICD-10-CM

## 2024-09-10 PROCEDURE — 29580 STRAPPING UNNA BOOT: CPT | Mod: 50

## 2024-09-10 PROCEDURE — 99213 OFFICE O/P EST LOW 20 MIN: CPT | Mod: 25

## 2024-09-10 RX ORDER — AMOXICILLIN AND CLAVULANATE POTASSIUM 250; 125 MG/1; MG/1
250-125 TABLET, FILM COATED ORAL
Qty: 10 | Refills: 0 | Status: ACTIVE | COMMUNITY
Start: 2024-09-10 | End: 1900-01-01

## 2024-09-10 NOTE — PHYSICAL EXAM
After Visit Summary   8/8/2017    Yasmany Sanchez    MRN: [id number]           Patient Information     Date Of Birth          1994        Visit Information        Provider Department      8/8/2017 5:00 PM Blanka Tierney PA [facility] and [facility]        Today's Diagnoses     Prostatitis, chronic    -  1      Care Instructions    Cipro- black box warning (tendinopathy) - aching legs and arms. Stop  Doxycycline - stomach upset.  Take with food but not milk.  Can cause sunburn.   Robaxin 1000mg three times daily as needed (for muscle spasm).  Take at home on the weekend to assure this doesn't impair you.   Return 3 months (or as needed)    Sandy Tierney          Follow-ups after your visit        Who to contact     Please call your clinic at [phone] to:    Ask questions about your health    Make or cancel appointments    Discuss your medicines    Learn about your test results    Speak to your doctor   If you have compliments or concerns about an experience at your clinic, or if you wish to file a complaint, please contact [facility] Patient Relations at [phone] or email us at [email or web address]         Additional Information About Your Visit        MyChart Information     [employer] gives you secure access to your electronic health record. If you see a primary care provider, you can also send messages to your care team and make appointments. If you have questions, please call your primary care clinic.  If you do not have a primary care provider, please call [phone] and they will assist you.      [employer] is an electronic gateway that provides easy, online access to your medical records. With [employer], you can request a clinic appointment, read your test results, renew a prescription or communicate with your care team.     To access your existing account, please contact your [facility]  "Clinic or call [phone] for assistance.        Care EveryWhere ID     This is your Care EveryWhere ID. This could be used by other organizations to access your [facility] medical records  [id number]        Your Vitals Were     Pulse Height BMI (Body Mass Index)             89 1.803 m (5' 11\") 27.06 kg/m2          Blood Pressure from Last 3 Encounters:   08/08/17 124/78   04/18/17 (P) 125/71   02/14/17 127/74    Weight from Last 3 Encounters:   08/08/17 88 kg (194 lb)   04/18/17 89.8 kg (198 lb)   02/14/17 94.3 kg (208 lb)              Today, you had the following     No orders found for display         Today's Medication Changes          These changes are accurate as of: 8/8/17  5:47 PM.  If you have any questions, ask your nurse or doctor.               Start taking these medicines.        Dose/Directions    ciprofloxacin 500 MG tablet   Commonly known as:  CIPRO   Used for:  Prostatitis, chronic   Started by:  Blanka Tierney PA        Dose:  500 mg   Take 1 tablet (500 mg) by mouth 2 times daily   Quantity:  60 tablet   Refills:  1       doxycycline 100 MG capsule   Commonly known as:  VIBRAMYCIN   Used for:  Prostatitis, chronic   Started by:  Blanka Tierney PA        Dose:  100 mg   Take 1 capsule (100 mg) by mouth 2 times daily   Quantity:  60 capsule   Refills:  1       methocarbamol 500 MG tablet   Commonly known as:  ROBAXIN   Used for:  Prostatitis, chronic   Started by:  Blanka Tierney PA        Dose:  1000 mg   Take 2 tablets (1,000 mg) by mouth 3 times daily as needed for muscle spasms   Quantity:  45 tablet   Refills:  1         Stop taking these medicines if you haven't already. Please contact your care team if you have questions.     azithromycin 500 MG tablet   Commonly known as:  ZITHROMAX   Stopped by:  Blanka Tierney PA           gabapentin 100 MG capsule   Commonly known as:  NEURONTIN   Stopped by:  Blanka Tierney PA           trospium 60 MG Cp24 24 hr capsule "   Commonly known as:  SANCTURA XR   Stopped by:  Blanka Tierney PA                Where to get your medicines      [id number] [place], [place] - [address] AT [facility] & [address]  [address] [address]     Phone:  [phone]     ciprofloxacin 500 MG tablet    doxycycline 100 MG capsule    methocarbamol 500 MG tablet                Primary Care Provider    None Specified       No primary provider on file.        Equal Access to Services     [facility]: Hadii aad ku hadasho Soomaali, waaxda luqadaha, qaybta kaalmada adeegyada, waxay idiin hayaan adeeg cristhianarakota culver . So [facility] [phone].    ATENCIÓN: Si habla español, tiene a galaviz disposición servicios gratuitos de asistencia lingüística. Llame al [phone].    We comply with applicable federal civil rights laws and [place] laws. We do not discriminate on the basis of race, color, national origin, age, disability sex, sexual orientation or gender identity.            Thank you!     Thank you for choosing [facility] UROLOGY AND [facility] PROSTATE [facility] UROLOGIC CANCERS  for your care. Our goal is always to provide you with excellent care. Hearing back from our patients is one way we can continue to improve our services. Please take a few minutes to complete the written survey that you may receive in the mail after your visit with us. Thank you!             Your Updated Medication List - Protect others around you: Learn how to safely use, store and throw away your medicines at www.disposemymeds.org.          This list is accurate as of: 8/8/17  5:47 PM.  Always use your most recent med list.                   Brand Name Dispense Instructions for use Diagnosis    cetirizine 10 MG tablet    zyrTEC     Take 10 mg by mouth        ciprofloxacin 500 MG tablet    CIPRO    60 tablet    Take 1 tablet (500 mg) by mouth 2 times daily    Prostatitis, chronic       doxycycline 100 MG  capsule    VIBRAMYCIN    60 capsule    Take 1 capsule (100 mg) by mouth 2 times daily    Prostatitis, chronic       EPINEPHrine 0.3 MG/0.3ML injection 2-pack    EPIPEN/ADRENACLICK/or ANY BX GENERIC EQUIV     Inject 0.3 mg into the muscle        methocarbamol 500 MG tablet    ROBAXIN    45 tablet    Take 2 tablets (1,000 mg) by mouth 3 times daily as needed for muscle spasms    Prostatitis, chronic       SINGULAIR 10 MG tablet   Generic drug:  montelukast      Take 10 mg by mouth        * albuterol (2.5 MG/3ML) 0.083% neb solution      Inhale 2.5 mg into the lungs        * VENTOLIN  (90 BASE) MCG/ACT Inhaler   Generic drug:  albuterol           * Notice:  This list has 2 medication(s) that are the same as other medications prescribed for you. Read the directions carefully, and ask your doctor or other care provider to review them with you.       [1+] : right 1+ [2+] : left 2+ [Ankle Swelling (On Exam)] : present [Ankle Swelling Bilaterally] : bilaterally  [Varicose Veins Of Lower Extremities] : bilaterally [] : bilaterally [Ankle Swelling On The Right] : mild [Skin Ulcer] : ulcer [Alert] : alert [Oriented to Person] : oriented to person [Oriented to Place] : oriented to place [Oriented to Time] : oriented to time [Calm] : calm [Right Carotid Bruit] : no bruit heard over the right carotid [Left Carotid Bruit] : no bruit heard over the left carotid [de-identified] : nad [de-identified] : wnl [de-identified] : no respiratory distress [FreeTextEntry1] : Mild bilateral leg venous insufficiency w mild bilateral leg stasis dermatitis, hyperpigmentation in the gator area and mild bilateral leg edema Multiple  bilateral leg small varicose  veins and spider veins  ant post calf and shin RLE Varicose veins measuring  1-3 mm in size on the calf /shin LLE Varicose veins measuring  1-3 mm in size on the calf /shin right  venous medial malleolus  ulcer 3 cm length x 2 cm width x 1 mm depth  left venous medial malleolus ulcer 0.5 cm length x 0.5 cm width x 1 mm depth serous drainage coming from RLE wound [de-identified] : wnl ambulates slowly [de-identified] : Kash Cranial nerves 2-12 kash grossly intact [de-identified] : cooperative

## 2024-09-10 NOTE — PROCEDURE
[FreeTextEntry1] : Kash Unna boot applied from toes to knee  w/o complications aquacel to RLE Pt bear procedure well Pt is not allergic to the unna boot

## 2024-09-10 NOTE — ASSESSMENT
[Arterial/Venous Disease] : arterial/venous disease [Other: _____] : [unfilled] [Ulcer Care] : ulcer care [FreeTextEntry1] : Impression symptomatic venous insuff, recurrent RLE medial malleolus wound, new LLE medial malleolus wound, RLE mild cellulitis   Plan  Med Conservative management leg elevation, knee high compression stockings 20-30mm Hg when unna boot d/c, wt loss, diet control will need home care for bilateral unna boot 2x/week  start augmentin 250 mg BID x 5 days (rx sent) d/w pt indication, risks and benefits of bilateral gsv rfa (right leg first followed by left) pt verbalizes understanding and agreement to procedures bilateral gsv rfa to be scheduled return to office this Friday for bilateral unna boot change if home care doesn't start     Varicose veins are enlarged, twisted veins. Varicose veins are caused by increased blood pressure in the veins.  The blood moves towards the heart by 1-way valves in the veins. When the valves become weakened or damaged, blood can collect in the veins and pool in your lower legs (ankles). This causes the veins to become enlarged and incompetent with reflux. Sitting or standing for long periods can cause blood to pool in the leg veins, increasing the pressure within the veins.  Risk factors for varicose veins or venous disease may include:  obesity, older age, standing or sitting for prolonged periods of time for several years, being female, pregnancy, taking oral contraceptive pills or hormone replacement, being inactive, and/or smoking.  The most common symptoms of varicose veins are sensations in the legs, such as a heavy feeling, burning, and/or aching. However, each individual may experience symptoms differently.  Other symptoms may include:  color changes in the skin, sores on the legs, or rash.  Severe varicose veins or venous disease may eventually produce long-term mild swelling that can result in more serious skin and tissue problems, such as ulcers and non-healing sores. Varicose veins and venous disease are diagnosed by a complete medical history, physical examination, and diagnostic studies for varicose veins including duplex ultrasound and color-flow imaging.   Medical treatment for varicose veins and venous disease include:  compression stockings, sclerotherapy, endovenous ablation and/or surgical treatment with microphlebectomy.

## 2024-09-10 NOTE — HISTORY OF PRESENT ILLNESS
[FreeTextEntry1] : Pt c/o bilateral leg pain swelling and discomfort w activity  pt states  new right ankle wound pt  was  started on po abx by nikhil ROD  Pt denies recent injury, travel or thrombophilic event   Pt denies lower extremities arterial insufficiency symptoms   Pt denies any recent  cardiac c/o , SOB, Chest Pain or recent heart attacks   [de-identified] : pt is here to evaluate bilateral legs home care services were d/c couple of weeks ago because RLE wound healed now c/o that rle medial ankle wound reopened and is draining states wound reopened 1 week ago c/o small left medial ankle wound states she wears LLE compression stockings

## 2024-09-13 ENCOUNTER — APPOINTMENT (OUTPATIENT)
Dept: VASCULAR SURGERY | Facility: CLINIC | Age: 89
End: 2024-09-13

## 2024-09-17 ENCOUNTER — APPOINTMENT (OUTPATIENT)
Dept: VASCULAR SURGERY | Facility: CLINIC | Age: 89
End: 2024-09-17

## 2024-11-12 ENCOUNTER — APPOINTMENT (OUTPATIENT)
Dept: VASCULAR SURGERY | Facility: CLINIC | Age: 89
End: 2024-11-12
Payer: MEDICARE

## 2024-11-12 VITALS
TEMPERATURE: 97.9 F | BODY MASS INDEX: 32.2 KG/M2 | HEART RATE: 92 BPM | SYSTOLIC BLOOD PRESSURE: 166 MMHG | DIASTOLIC BLOOD PRESSURE: 79 MMHG | HEIGHT: 60 IN | WEIGHT: 164 LBS

## 2024-11-12 DIAGNOSIS — I87.2 VENOUS INSUFFICIENCY (CHRONIC) (PERIPHERAL): ICD-10-CM

## 2024-11-12 DIAGNOSIS — L03.115 CELLULITIS OF RIGHT LOWER LIMB: ICD-10-CM

## 2024-11-12 PROCEDURE — 29580 STRAPPING UNNA BOOT: CPT | Mod: 50

## 2024-11-12 PROCEDURE — 99213 OFFICE O/P EST LOW 20 MIN: CPT | Mod: 25

## 2024-11-12 RX ORDER — AMOXICILLIN AND CLAVULANATE POTASSIUM 500; 125 MG/1; MG/1
500-125 TABLET, FILM COATED ORAL
Qty: 14 | Refills: 0 | Status: ACTIVE | COMMUNITY
Start: 2024-11-12 | End: 1900-01-01

## 2024-11-14 RX ORDER — LIDOCAINE HYDROCHLORIDE 10 MG/ML
1 INJECTION, SOLUTION INFILTRATION; PERINEURAL
Qty: 50 | Refills: 0 | Status: COMPLETED | COMMUNITY
Start: 2024-11-14 | End: 1900-01-01

## 2024-11-20 ENCOUNTER — APPOINTMENT (OUTPATIENT)
Dept: VASCULAR SURGERY | Facility: CLINIC | Age: 89
End: 2024-11-20

## 2024-11-20 PROCEDURE — 29580 STRAPPING UNNA BOOT: CPT | Mod: 50

## 2024-11-26 ENCOUNTER — APPOINTMENT (OUTPATIENT)
Dept: VASCULAR SURGERY | Facility: CLINIC | Age: 89
End: 2024-11-26
Payer: MEDICARE

## 2024-11-26 VITALS
BODY MASS INDEX: 32.2 KG/M2 | HEART RATE: 79 BPM | SYSTOLIC BLOOD PRESSURE: 136 MMHG | WEIGHT: 164 LBS | TEMPERATURE: 97.4 F | HEIGHT: 60 IN | DIASTOLIC BLOOD PRESSURE: 65 MMHG

## 2024-11-26 VITALS — DIASTOLIC BLOOD PRESSURE: 79 MMHG | HEART RATE: 82 BPM | SYSTOLIC BLOOD PRESSURE: 125 MMHG

## 2024-11-26 DIAGNOSIS — I87.2 VENOUS INSUFFICIENCY (CHRONIC) (PERIPHERAL): ICD-10-CM

## 2024-11-26 DIAGNOSIS — I83.893 VARICOSE VEINS OF BILATERAL LOWER EXTREMITIES WITH OTHER COMPLICATIONS: ICD-10-CM

## 2024-11-26 PROCEDURE — 99213 OFFICE O/P EST LOW 20 MIN: CPT | Mod: 25

## 2024-11-26 PROCEDURE — 29580 STRAPPING UNNA BOOT: CPT | Mod: 50

## 2024-11-27 ENCOUNTER — NON-APPOINTMENT (OUTPATIENT)
Age: 89
End: 2024-11-27

## 2024-12-12 RX ORDER — LIDOCAINE HYDROCHLORIDE 10 MG/ML
1 INJECTION, SOLUTION INFILTRATION; PERINEURAL
Qty: 50 | Refills: 0 | Status: COMPLETED | COMMUNITY
Start: 2024-12-12 | End: 1900-01-01

## 2024-12-18 ENCOUNTER — APPOINTMENT (OUTPATIENT)
Dept: VASCULAR SURGERY | Facility: CLINIC | Age: 88
End: 2024-12-18
Payer: MEDICARE

## 2024-12-18 VITALS
BODY MASS INDEX: 32.59 KG/M2 | TEMPERATURE: 98.1 F | SYSTOLIC BLOOD PRESSURE: 141 MMHG | WEIGHT: 166 LBS | HEIGHT: 60 IN | HEART RATE: 79 BPM | DIASTOLIC BLOOD PRESSURE: 68 MMHG

## 2024-12-18 DIAGNOSIS — I83.893 VARICOSE VEINS OF BILATERAL LOWER EXTREMITIES WITH OTHER COMPLICATIONS: ICD-10-CM

## 2024-12-18 DIAGNOSIS — I87.2 VENOUS INSUFFICIENCY (CHRONIC) (PERIPHERAL): ICD-10-CM

## 2024-12-18 PROCEDURE — 99213 OFFICE O/P EST LOW 20 MIN: CPT | Mod: 25

## 2024-12-18 PROCEDURE — 29580 STRAPPING UNNA BOOT: CPT | Mod: 50

## 2025-02-13 DIAGNOSIS — I83.893 VARICOSE VEINS OF BILATERAL LOWER EXTREMITIES WITH OTHER COMPLICATIONS: ICD-10-CM

## 2025-02-13 RX ORDER — LIDOCAINE HYDROCHLORIDE 10 MG/ML
1 INJECTION, SOLUTION INFILTRATION; PERINEURAL
Qty: 50 | Refills: 0 | Status: COMPLETED | COMMUNITY
Start: 2025-02-13 | End: 1900-01-01

## 2025-02-13 RX ORDER — ALPRAZOLAM 0.25 MG/1
0.25 TABLET ORAL
Qty: 1 | Refills: 0 | Status: COMPLETED | COMMUNITY
Start: 2025-02-13 | End: 1900-01-01

## 2025-02-25 ENCOUNTER — APPOINTMENT (OUTPATIENT)
Dept: VASCULAR SURGERY | Facility: CLINIC | Age: 89
End: 2025-02-25

## 2025-02-26 ENCOUNTER — APPOINTMENT (OUTPATIENT)
Dept: VASCULAR SURGERY | Facility: CLINIC | Age: 89
End: 2025-02-26

## 2025-03-12 ENCOUNTER — NON-APPOINTMENT (OUTPATIENT)
Age: 89
End: 2025-03-12

## 2025-03-13 DIAGNOSIS — I83.893 VARICOSE VEINS OF BILATERAL LOWER EXTREMITIES WITH OTHER COMPLICATIONS: ICD-10-CM

## 2025-03-13 RX ORDER — LIDOCAINE HYDROCHLORIDE 10 MG/ML
1 INJECTION, SOLUTION INFILTRATION; PERINEURAL
Qty: 50 | Refills: 0 | Status: COMPLETED | COMMUNITY
Start: 2025-03-13 | End: 1900-01-01

## 2025-03-19 ENCOUNTER — APPOINTMENT (OUTPATIENT)
Dept: VASCULAR SURGERY | Facility: CLINIC | Age: 89
End: 2025-03-19
Payer: MEDICARE

## 2025-03-19 PROCEDURE — 36475 ENDOVENOUS RF 1ST VEIN: CPT | Mod: RT

## 2025-03-26 ENCOUNTER — APPOINTMENT (OUTPATIENT)
Dept: VASCULAR SURGERY | Facility: CLINIC | Age: 89
End: 2025-03-26
Payer: MEDICARE

## 2025-03-26 VITALS
HEIGHT: 60 IN | TEMPERATURE: 97.9 F | HEART RATE: 81 BPM | SYSTOLIC BLOOD PRESSURE: 147 MMHG | DIASTOLIC BLOOD PRESSURE: 83 MMHG

## 2025-03-26 DIAGNOSIS — I87.2 VENOUS INSUFFICIENCY (CHRONIC) (PERIPHERAL): ICD-10-CM

## 2025-03-26 DIAGNOSIS — I82.501 CHRONIC EMBOLISM AND THROMBOSIS OF UNSPECIFIED DEEP VEINS OF RIGHT LOWER EXTREMITY: ICD-10-CM

## 2025-03-26 PROCEDURE — 29580 STRAPPING UNNA BOOT: CPT | Mod: 50

## 2025-03-26 PROCEDURE — 93971 EXTREMITY STUDY: CPT | Mod: RT

## 2025-03-26 PROCEDURE — 99214 OFFICE O/P EST MOD 30 MIN: CPT | Mod: 25

## 2025-04-01 PROBLEM — I82.501 CHRONIC VENOUS EMBOLISM AND THROMBOSIS OF DEEP VESSELS OF RIGHT LOWER EXTREMITY: Status: ACTIVE | Noted: 2025-04-01

## 2025-04-01 PROBLEM — I82.501 LEG DVT (DEEP VENOUS THROMBOEMBOLISM), CHRONIC, RIGHT: Status: ACTIVE | Noted: 2025-04-01

## 2025-04-03 ENCOUNTER — NON-APPOINTMENT (OUTPATIENT)
Age: 89
End: 2025-04-03

## 2025-04-03 DIAGNOSIS — I83.893 VARICOSE VEINS OF BILATERAL LOWER EXTREMITIES WITH OTHER COMPLICATIONS: ICD-10-CM

## 2025-04-03 RX ORDER — LIDOCAINE HYDROCHLORIDE 10 MG/ML
1 INJECTION, SOLUTION INFILTRATION; PERINEURAL
Qty: 50 | Refills: 0 | Status: COMPLETED | COMMUNITY
Start: 2025-04-03 | End: 1900-01-01

## 2025-04-03 RX ORDER — ALPRAZOLAM 0.25 MG/1
0.25 TABLET ORAL
Qty: 1 | Refills: 0 | Status: COMPLETED | COMMUNITY
Start: 2025-04-03 | End: 1900-01-01

## 2025-04-09 ENCOUNTER — APPOINTMENT (OUTPATIENT)
Dept: VASCULAR SURGERY | Facility: CLINIC | Age: 89
End: 2025-04-09
Payer: MEDICARE

## 2025-04-09 PROCEDURE — 36475 ENDOVENOUS RF 1ST VEIN: CPT | Mod: LT

## 2025-04-11 RX ORDER — HYDROCORTISONE 1 %
12 CREAM (GRAM) TOPICAL
Qty: 1 | Refills: 3 | Status: ACTIVE | COMMUNITY
Start: 2025-04-11 | End: 1900-01-01

## 2025-04-15 ENCOUNTER — APPOINTMENT (OUTPATIENT)
Dept: VASCULAR SURGERY | Facility: CLINIC | Age: 89
End: 2025-04-15
Payer: MEDICARE

## 2025-04-15 VITALS
BODY MASS INDEX: 33.47 KG/M2 | WEIGHT: 166 LBS | SYSTOLIC BLOOD PRESSURE: 160 MMHG | TEMPERATURE: 97.5 F | DIASTOLIC BLOOD PRESSURE: 77 MMHG | HEART RATE: 68 BPM | HEIGHT: 59 IN

## 2025-04-15 DIAGNOSIS — I83.893 VARICOSE VEINS OF BILATERAL LOWER EXTREMITIES WITH OTHER COMPLICATIONS: ICD-10-CM

## 2025-04-15 DIAGNOSIS — I87.2 VENOUS INSUFFICIENCY (CHRONIC) (PERIPHERAL): ICD-10-CM

## 2025-04-15 PROCEDURE — 93971 EXTREMITY STUDY: CPT | Mod: LT

## 2025-04-15 PROCEDURE — 99214 OFFICE O/P EST MOD 30 MIN: CPT | Mod: 25

## 2025-04-15 PROCEDURE — 29580 STRAPPING UNNA BOOT: CPT | Mod: 50

## 2025-04-29 ENCOUNTER — APPOINTMENT (OUTPATIENT)
Dept: VASCULAR SURGERY | Facility: CLINIC | Age: 89
End: 2025-04-29
Payer: MEDICARE

## 2025-04-29 VITALS
HEART RATE: 69 BPM | TEMPERATURE: 97.9 F | HEIGHT: 59 IN | WEIGHT: 166 LBS | SYSTOLIC BLOOD PRESSURE: 183 MMHG | BODY MASS INDEX: 33.47 KG/M2 | DIASTOLIC BLOOD PRESSURE: 73 MMHG

## 2025-04-29 DIAGNOSIS — I87.2 VENOUS INSUFFICIENCY (CHRONIC) (PERIPHERAL): ICD-10-CM

## 2025-04-29 DIAGNOSIS — I83.893 VARICOSE VEINS OF BILATERAL LOWER EXTREMITIES WITH OTHER COMPLICATIONS: ICD-10-CM

## 2025-04-29 PROCEDURE — 99213 OFFICE O/P EST LOW 20 MIN: CPT

## 2025-05-13 ENCOUNTER — APPOINTMENT (OUTPATIENT)
Dept: VASCULAR SURGERY | Facility: CLINIC | Age: 89
End: 2025-05-13

## 2025-07-01 ENCOUNTER — APPOINTMENT (OUTPATIENT)
Dept: VASCULAR SURGERY | Facility: CLINIC | Age: 89
End: 2025-07-01

## 2025-08-25 ENCOUNTER — APPOINTMENT (OUTPATIENT)
Dept: VASCULAR SURGERY | Facility: CLINIC | Age: 89
End: 2025-08-25
Payer: MEDICARE

## 2025-08-25 VITALS
TEMPERATURE: 97.5 F | BODY MASS INDEX: 30.24 KG/M2 | HEART RATE: 61 BPM | WEIGHT: 150 LBS | HEIGHT: 59 IN | SYSTOLIC BLOOD PRESSURE: 150 MMHG | DIASTOLIC BLOOD PRESSURE: 61 MMHG

## 2025-08-25 DIAGNOSIS — I83.893 VARICOSE VEINS OF BILATERAL LOWER EXTREMITIES WITH OTHER COMPLICATIONS: ICD-10-CM

## 2025-08-25 DIAGNOSIS — I87.2 VENOUS INSUFFICIENCY (CHRONIC) (PERIPHERAL): ICD-10-CM

## 2025-08-25 PROCEDURE — 99213 OFFICE O/P EST LOW 20 MIN: CPT
